# Patient Record
Sex: MALE | Race: WHITE | NOT HISPANIC OR LATINO | URBAN - METROPOLITAN AREA
[De-identification: names, ages, dates, MRNs, and addresses within clinical notes are randomized per-mention and may not be internally consistent; named-entity substitution may affect disease eponyms.]

---

## 2021-04-20 ENCOUNTER — APPOINTMENT (RX ONLY)
Dept: URBAN - METROPOLITAN AREA CLINIC 84 | Facility: CLINIC | Age: 66
Setting detail: DERMATOLOGY
End: 2021-04-20

## 2021-04-20 DIAGNOSIS — L30.9 DERMATITIS, UNSPECIFIED: ICD-10-CM

## 2021-04-20 PROCEDURE — 11104 PUNCH BX SKIN SINGLE LESION: CPT

## 2021-04-20 PROCEDURE — ? PRESCRIPTION

## 2021-04-20 PROCEDURE — ? BIOPSY BY PUNCH METHOD

## 2021-04-20 PROCEDURE — ? ADDITIONAL NOTES

## 2021-04-20 PROCEDURE — ? PHOTO-DOCUMENTATION

## 2021-04-20 PROCEDURE — ? COUNSELING

## 2021-04-20 RX ORDER — TRIAMCINOLONE ACETONIDE 1 MG/G
OINTMENT TOPICAL
Qty: 1 | Refills: 1 | Status: ERX | COMMUNITY
Start: 2021-04-20

## 2021-04-20 RX ADMIN — TRIAMCINOLONE ACETONIDE: 1 OINTMENT TOPICAL at 00:00

## 2021-04-20 ASSESSMENT — LOCATION SIMPLE DESCRIPTION DERM
LOCATION SIMPLE: SCROTUM
LOCATION SIMPLE: GROIN
LOCATION SIMPLE: POSTERIOR NECK
LOCATION SIMPLE: LEFT UPPER ARM

## 2021-04-20 ASSESSMENT — LOCATION DETAILED DESCRIPTION DERM
LOCATION DETAILED: SUPRAPUBIC SKIN
LOCATION DETAILED: LEFT DISTAL POSTERIOR UPPER ARM
LOCATION DETAILED: RIGHT POSTERIOR NECK
LOCATION DETAILED: RIGHT ANTERIOR SCROTUM

## 2021-04-20 ASSESSMENT — LOCATION ZONE DERM
LOCATION ZONE: TRUNK
LOCATION ZONE: ARM
LOCATION ZONE: NECK
LOCATION ZONE: GENITALIA

## 2021-04-20 NOTE — PROCEDURE: ADDITIONAL NOTES
Additional Notes: Discussed requesting records from previous Dermatologist and Surgeon where previous work up, labs, and biopsies were done. Patient declined and prefers new biopsy today given inconclusive biopsies in past. Patient states he is unable to see Infectious Disease without biopsy given prior to visit. States is worried about an infection which is causing pain and effecting his ability to walk. \\n\\nSee attached medications patient has taken prior (scanned). Will trial topical steroid.
Detail Level: Simple
Render Risk Assessment In Note?: no

## 2021-04-20 NOTE — HPI: RASH
What Type Of Note Output Would You Prefer (Optional)?: Bullet Format
How Severe Is Your Rash?: severe
Is This A New Presentation, Or A Follow-Up?: Rash
Additional History: Patient has has rash x 7 years. He was bit by a mosquito while working [place] the woods near [employer]. While he was urinating a mosquito bit his penis. Two days later he had an ulceration. He has “something” living inside him. He has been treated with parasite medications which killed them.  Now there is dead tissue in place where the living track once was. He has been on oral antibiotics which help, but then the lesions come back. The pain effects his ability to walk. He has a referral to infectious diseases but requires biopsies before the appointment.